# Patient Record
Sex: FEMALE | Race: WHITE
[De-identification: names, ages, dates, MRNs, and addresses within clinical notes are randomized per-mention and may not be internally consistent; named-entity substitution may affect disease eponyms.]

---

## 2019-11-13 ENCOUNTER — HOSPITAL ENCOUNTER (OUTPATIENT)
Dept: HOSPITAL 47 - LABWHC1 | Age: 49
Discharge: HOME | End: 2019-11-13
Attending: INTERNAL MEDICINE
Payer: COMMERCIAL

## 2019-11-13 DIAGNOSIS — E03.9: ICD-10-CM

## 2019-11-13 DIAGNOSIS — I10: Primary | ICD-10-CM

## 2019-11-13 LAB
ANION GAP SERPL CALC-SCNC: 8.7 MMOL/L (ref 4–12)
BUN SERPL-SCNC: 22 MG/DL (ref 9–27)
BUN/CREAT SERPL: 31.43 RATIO (ref 12–20)
CALCIUM SPEC-MCNC: 9.4 MG/DL (ref 8.7–10.3)
CHLORIDE SERPL-SCNC: 103 MMOL/L (ref 96–109)
CO2 SERPL-SCNC: 27.3 MMOL/L (ref 21.6–31.8)
GLUCOSE SERPL-MCNC: 88 MG/DL (ref 70–110)
POTASSIUM SERPL-SCNC: 3.7 MMOL/L (ref 3.5–5.5)
SODIUM SERPL-SCNC: 139 MMOL/L (ref 135–145)
T4 FREE SERPL-MCNC: 0.9 NG/DL (ref 0.8–1.8)

## 2019-11-13 PROCEDURE — 84439 ASSAY OF FREE THYROXINE: CPT

## 2019-11-13 PROCEDURE — 84432 ASSAY OF THYROGLOBULIN: CPT

## 2019-11-13 PROCEDURE — 36415 COLL VENOUS BLD VENIPUNCTURE: CPT

## 2019-11-13 PROCEDURE — 86376 MICROSOMAL ANTIBODY EACH: CPT

## 2019-11-13 PROCEDURE — 80048 BASIC METABOLIC PNL TOTAL CA: CPT

## 2019-11-13 PROCEDURE — 86800 THYROGLOBULIN ANTIBODY: CPT

## 2019-11-13 PROCEDURE — 84443 ASSAY THYROID STIM HORMONE: CPT

## 2020-05-15 ENCOUNTER — HOSPITAL ENCOUNTER (EMERGENCY)
Dept: HOSPITAL 47 - EC | Age: 50
Discharge: HOME | End: 2020-05-15
Payer: COMMERCIAL

## 2020-05-15 VITALS
RESPIRATION RATE: 16 BRPM | TEMPERATURE: 98 F | SYSTOLIC BLOOD PRESSURE: 145 MMHG | DIASTOLIC BLOOD PRESSURE: 98 MMHG | HEART RATE: 76 BPM

## 2020-05-15 DIAGNOSIS — Z88.2: ICD-10-CM

## 2020-05-15 DIAGNOSIS — Z79.890: ICD-10-CM

## 2020-05-15 DIAGNOSIS — M54.12: Primary | ICD-10-CM

## 2020-05-15 DIAGNOSIS — Z88.1: ICD-10-CM

## 2020-05-15 DIAGNOSIS — Z88.5: ICD-10-CM

## 2020-05-15 DIAGNOSIS — Z79.899: ICD-10-CM

## 2020-05-15 DIAGNOSIS — E06.3: ICD-10-CM

## 2020-05-15 DIAGNOSIS — Z88.8: ICD-10-CM

## 2020-05-15 DIAGNOSIS — K21.9: ICD-10-CM

## 2020-05-15 DIAGNOSIS — E78.5: ICD-10-CM

## 2020-05-15 DIAGNOSIS — I10: ICD-10-CM

## 2020-05-15 DIAGNOSIS — E11.9: ICD-10-CM

## 2020-05-15 DIAGNOSIS — Z98.1: ICD-10-CM

## 2020-05-15 PROCEDURE — 72050 X-RAY EXAM NECK SPINE 4/5VWS: CPT

## 2020-05-15 PROCEDURE — 96372 THER/PROPH/DIAG INJ SC/IM: CPT

## 2020-05-15 PROCEDURE — 99283 EMERGENCY DEPT VISIT LOW MDM: CPT

## 2020-05-15 NOTE — ED
Extremity Problem HPI





- General


Chief complaint: Extremity Problem,Nontraumatic


Stated complaint: Neck and back pain


Time Seen by Provider: 05/15/20 19:46


Source: patient, family


Mode of arrival: ambulatory


Limitations: no limitations





- History of Present Illness


Initial comments: 





Patient is a 49-year-old female presenting to emergency Department with a chief 

complaint of neck pain.  Patient reports history of chronic back pain.  She also

had a fusion of C5 and C6 about 12 years ago.  Patient reports also disc 

herniations.  Patient reports the pain started about one week ago and has been 

gradually getting worse.  Patient reports the pain starts near the cervical 

prominence and radiates bilaterally along the traps and bilateral upper 

extremity.  Patient reports it is a burning sensation near the neck, however she

does have general achiness particularly in the left upper extremity.  Patient 

denies any weakness.  States she had the exact same symptoms in the right upper 

extremity before she had a cervical fusion.  Denies any chest pain shortness of 

breath and diaphoretic episodes, nausea vomiting, dizziness or lightheadedness. 

Does report taking ibuprofen this morning with no significant department.  

Patient denies trauma.





- Related Data


                                Home Medications











 Medication  Instructions  Recorded  Confirmed


 


Atorvastatin [Lipitor] 10 mg PO HS 18


 


Cyanocobalamin (Vitamin B-12) 5,000 mcg PO DAILY 18





[Vitamin B-12]   


 


Hydrochlorothiazide 25 mg PO DAILY 18


 


Levothyroxine Sodium [Synthroid] 100 mcg PO DAILY 18


 


Lisinopril [Zestril] 5 mg PO DAILY 18


 


Potassium Chloride [Klor-Con 20] 20 meq PO DAILY 18


 


Ranitidine HCl 300 mg PO HS 18








                                  Previous Rx's











 Medication  Instructions  Recorded


 


Cyclobenzaprine [Flexeril] 10 mg PO TID PRN #15 tab 20


 


Lidocaine 5% Patch [Lidoderm] 1 patch TOPICAL DAILY #6 patch 20











                                    Allergies











Allergy/AdvReac Type Severity Reaction Status Date / Time


 


codeine Allergy Severe Vomiting,ra Verified 05/15/20 20:19





   sh  


 


Androgenic Anabolic Steroid AdvReac Severe Nausea & Verified 05/15/20 20:19





   Vomiting,  





   Swelling  


 


trimethoprim [From Bactrim] AdvReac Severe Nausea & Verified 05/15/20 20:19





   Vomiting &  





   Diarrhea  


 


sulfamethoxazole AdvReac  Nausea & Verified 05/15/20 20:19





[From Bactrim]   Vomiting &  





   Diarrhea  














Review of Systems


ROS Statement: 


Those systems with pertinent positive or pertinent negative responses have been 

documented in the HPI.





ROS Other: All systems not noted in ROS Statement are negative.





Past Medical History


Past Medical History: GERD/Reflux, Hyperlipidemia, Hypertension, Thyroid 

Disorder


Additional Past Medical History / Comment(s): IBS/diverticulitis, fatty liver, 

uterine fibroids, hashimoto's thyroid, borderline Type 2 DM,


History of Any Multi-Drug Resistant Organisms: MRSA


Date of last positivie culture/infection: 10/17/2013


MDRO Source:: forehead


Past Surgical History: Back Surgery, Breast Surgery, Cholecystectomy, 

Hysterectomy, Tonsillectomy, Tubal Ligation, Uterine Ablation


Additional Past Surgical History / Comment(s): cervical fusion 2008, breast

reduction , fatty tumors removed from the following areas: right axilla, 

left forearm, left anterior upper leg x2, right posterior upper leg.


Past Anesthesia/Blood Transfusion Reactions: Motion Sickness, Postoperative 

Nausea & Vomiting (PONV)


Past Psychological History: No Psychological Hx Reported


Smoking Status: Never smoker


Past Alcohol Use History: None Reported


Past Drug Use History: None Reported





- Past Family History


  ** Sister(s)


Family Medical History: Cancer, Fibromyalgia


Additional Family Medical History / Comment(s): first sister: ovarian cancer 

2018.  second sister: fibromyalgia 





  ** Father


Family Medical History: Myocardial Infarction (MI)


Additional Family Medical History / Comment(s):  at age 49





  ** Mother


Family Medical History: Cancer


Additional Family Medical History / Comment(s): pancreatic cancer  at 

age 59





General Exam


Limitations: no limitations


General appearance: alert, in no apparent distress


Head exam: Present: atraumatic, normocephalic, normal inspection


Eye exam: Present: normal appearance, PERRL, EOMI


Pupils: Present: normal accommodation


ENT exam: Present: normal exam, normal oropharynx, mucous membranes moist, TM's 

normal bilaterally, normal external ear exam


Neck exam: Present: normal inspection, tenderness (Tenderness surrounding cer

vical prominence.  Pain radiating bilaterally but mostly along the left upper 

extremity.  Pain along the left trapezius.), full ROM


Respiratory exam: Present: normal lung sounds bilaterally


Cardiovascular Exam: Present: regular rate, normal rhythm, normal heart sounds


Extremities exam: Present: normal inspection, full ROM


Back exam: Present: normal inspection, full ROM.  Absent: tenderness, muscle 

spasm, paraspinal tenderness, vertebral tenderness


Neurological exam: Present: alert, oriented X3, CN II-XII intact, normal gait


Psychiatric exam: Present: normal affect, normal mood


Skin exam: Present: warm, dry, intact, normal color





Course


                                   Vital Signs











  05/15/20 05/15/20





  19:27 21:35


 


Temperature 98.2 F 98.0 F


 


Pulse Rate 89 76


 


Respiratory 18 16





Rate  


 


Blood Pressure 146/99 145/98


 


O2 Sat by Pulse 97 99





Oximetry  














Medical Decision Making





- Medical Decision Making


Patient is a 49-year-old female with history of chronic neck pain presenting to 

the emergency department with a chief complaint of neck pain.  Symptoms started 

approximately a week with no trauma.  Patient has radiculopathy type symptoms 

radiating along bilateral upper extremities no mostly on the left side.  States 

she had these symptoms previously going mostly on her right upper extremity 

prior to cervical fusion of C5 and C6.  Cervical x-rays unremarkable.  Patient 

was given a Lidoderm patch, Toradol and Flexeril.  Reevaluation patient reports 

improvement symptoms.  No chest pain shortness of breath.  Patient advised to 

follow-up with an orthopedic specialist.  Patient will be discharged with 

Flexeril and advise about possible side effects of medication.  She was also 

given a  Lidoderm patch.  Return parameters were thoroughly discussed with 

patient is understanding and agreeable.  Case discussed with physician.








Disposition


Clinical Impression: 


 Cervical radiculopathy, Neck pain





Disposition: HOME SELF-CARE


Condition: Good


Instructions (If sedation given, give patient instructions):  Cervical Strain 

(DC)


Additional Instructions: 


Take prescribed medication as directed.  Follow-up with an orthopedic doctor.  

Return to emergency department if symptoms worsen.


Is patient prescribed a controlled substance at d/c from ED?: No


Referrals: 


Kevin Molina MD [Primary Care Provider] - 1-2 days


MARY Villarreal DO [Doctor of Osteopathic Medicine] - 1-2 days


Time of Disposition: 21:23

## 2020-05-15 NOTE — XR
EXAMINATION TYPE: XR cervical spine comp

 

DATE OF EXAM: 5/15/2020

 

COMPARISON: NONE

 

HISTORY: Neck pain

 

TECHNIQUE: 5 views

 

FINDINGS: Cervical vertebra have normal spacing and alignment.

There is old anterior fusion surgery at C5-6. Posterior elements are intact. There is no evidence of 
a fracture. Cervical basilar relationships is normal. Neural foramina appear fairly normal.

IMPRESSION: Negative cervical spine exam.

## 2020-07-08 ENCOUNTER — HOSPITAL ENCOUNTER (OUTPATIENT)
Dept: HOSPITAL 47 - RADMRIMAIN | Age: 50
Discharge: HOME | End: 2020-07-08
Attending: ORTHOPAEDIC SURGERY
Payer: COMMERCIAL

## 2020-07-08 DIAGNOSIS — M47.892: ICD-10-CM

## 2020-07-08 DIAGNOSIS — M48.02: ICD-10-CM

## 2020-07-08 DIAGNOSIS — Z98.1: ICD-10-CM

## 2020-07-08 DIAGNOSIS — M50.322: Primary | ICD-10-CM

## 2020-07-08 DIAGNOSIS — M25.78: ICD-10-CM

## 2020-07-08 PROCEDURE — 72156 MRI NECK SPINE W/O & W/DYE: CPT

## 2020-07-08 NOTE — MR
EXAMINATION TYPE: MR cervical spine wo/w con

 

DATE OF EXAM: 7/8/2020

 

COMPARISON: NONE

 

HISTORY: 49-year-old female Neck pain, headaches, left arm numbness, DDD

 

Technique: Multiplanar, multisequence images of the cervical spine were obtained before and after adm
inistration of 11 mL intravenous Gadavist gadolinium contrast.  

 

FINDINGS: 

No craniocervical junction abnormality, predental space widening, or prevertebral soft tissue swellin
g.

 

Status post C5-C6 ACDF.

 

Mild-to-moderate degenerative disc disease throughout the remaining levels with desiccated disks and 
discussed by complex formation.

 

Scattered facet and uncovertebral joint arthropathy.

 

Alignment is maintained.

 

At C2-C3, mild facet and uncovertebral joint arthropathy. No significant canal or foraminal stenosis.


 

At C3-C4, broad-based disc osteophyte complex with uncovertebral joint and facet degenerative change.
 There is mild left neuroforaminal stenosis and mild overall spinal canal stenosis with abutment of t
he ventral cord.

 

At C4-C5, above the fusion, there is broad-based disc osteophyte complex with uncovertebral joint and
 facet degenerative change. Moderate left and mild to moderate right neuroforaminal stenosis. Mild sp
inal canal stenosis with abutment of the ventral cord.

 

At the fused C5-C6 level, no significant canal or foraminal stenosis.

 

At C6-C7, below the fusion, there is disc osteophyte complex with superimposed right paracentral disc
 protrusion. Facet and uncovertebral joint degenerative change. Moderate right greater than left neur
al foraminal stenosis. Mild to moderate spinal canal stenosis with abutment of both the dorsal and ve
ntral cord and slight ventral cord flattening. Greater degree of compression on the right ventral cor
d due to the superimposed disc protrusion.

 

At C7-T1, mild facet arthropathy without canal or foraminal stenosis.

 

No suspicious enhancement within the spinal canal.

 

Some patchy signal projecting over the cervical cord on the sagittal T2 sequence. No definite T2-weig
hted cord could not.

 

 

IMPRESSION: 

 

1. Status post C5-C6 ACDF with mild-to-moderate degenerative disc disease throughout the remainder of
 the cervical spine. Scattered facet and uncovertebral joint arthropathy.

2. Disc osteophyte complex below the fusion at C6-C7 along with a superimposed right paracentral disc
 herniation contributes to mild-to-moderate spinal canal stenosis with abutment of both the dorsal an
d ventral cord and slight ventral cord flattening. No dianne cord compression or myelopathic cord sign
al change. Moderate right greater than left neuroforaminal stenosis here.

3. Additional mild spinal canal stenoses at C3-C4 and C4-C5.

4. Additional variable mild to moderate neural foraminal stenoses as outlined above.

## 2020-07-31 ENCOUNTER — HOSPITAL ENCOUNTER (OUTPATIENT)
Dept: HOSPITAL 47 - RADXRMAIN | Age: 50
Discharge: HOME | End: 2020-07-31
Attending: ORTHOPAEDIC SURGERY
Payer: COMMERCIAL

## 2020-07-31 DIAGNOSIS — Z01.818: Primary | ICD-10-CM

## 2020-07-31 PROCEDURE — 93005 ELECTROCARDIOGRAM TRACING: CPT

## 2020-07-31 PROCEDURE — 71046 X-RAY EXAM CHEST 2 VIEWS: CPT

## 2020-07-31 NOTE — XR
EXAMINATION TYPE: XR chest 2V

 

DATE OF EXAM: 7/31/2020

 

COMPARISON: 11/27/2017

 

HISTORY: Chest pain

 

TECHNIQUE:  Frontal and lateral views of the chest are obtained.

 

FINDINGS:  

 

There is no focal air space opacity.

 

No evidence for pneumothorax.  No pleural effusion.

 

The cardiac silhouette size is within normal limits.

 

The osseous structures are grossly intact.

 

IMPRESSION:  

 

1.  No acute cardiopulmonary process.

## 2020-08-04 ENCOUNTER — HOSPITAL ENCOUNTER (OUTPATIENT)
Dept: HOSPITAL 47 - LABPAT | Age: 50
Discharge: HOME | End: 2020-08-04
Attending: ORTHOPAEDIC SURGERY
Payer: COMMERCIAL

## 2020-08-04 DIAGNOSIS — M50.223: ICD-10-CM

## 2020-08-04 DIAGNOSIS — Z01.818: Primary | ICD-10-CM

## 2020-08-04 LAB
ALBUMIN SERPL-MCNC: 4.1 G/DL (ref 3.5–5)
ALP SERPL-CCNC: 63 U/L (ref 38–126)
ALT SERPL-CCNC: 80 U/L (ref 4–34)
ANION GAP SERPL CALC-SCNC: 8 MMOL/L
APTT BLD: 21.9 SEC (ref 22–30)
AST SERPL-CCNC: 53 U/L (ref 14–36)
BASOPHILS # BLD AUTO: 0.1 K/UL (ref 0–0.2)
BASOPHILS NFR BLD AUTO: 1 %
BUN SERPL-SCNC: 18 MG/DL (ref 7–17)
CALCIUM SPEC-MCNC: 9.7 MG/DL (ref 8.4–10.2)
CHLORIDE SERPL-SCNC: 104 MMOL/L (ref 98–107)
CO2 SERPL-SCNC: 25 MMOL/L (ref 22–30)
EOSINOPHIL # BLD AUTO: 0.2 K/UL (ref 0–0.7)
EOSINOPHIL NFR BLD AUTO: 3 %
ERYTHROCYTE [DISTWIDTH] IN BLOOD BY AUTOMATED COUNT: 4.41 M/UL (ref 3.8–5.4)
ERYTHROCYTE [DISTWIDTH] IN BLOOD: 12.8 % (ref 11.5–15.5)
GLUCOSE SERPL-MCNC: 156 MG/DL (ref 74–99)
HCT VFR BLD AUTO: 42 % (ref 34–46)
HGB BLD-MCNC: 13.6 GM/DL (ref 11.4–16)
INR PPP: 1 (ref ?–1.2)
LYMPHOCYTES # SPEC AUTO: 1.8 K/UL (ref 1–4.8)
LYMPHOCYTES NFR SPEC AUTO: 26 %
MCH RBC QN AUTO: 30.9 PG (ref 25–35)
MCHC RBC AUTO-ENTMCNC: 32.5 G/DL (ref 31–37)
MCV RBC AUTO: 95.2 FL (ref 80–100)
MONOCYTES # BLD AUTO: 0.2 K/UL (ref 0–1)
MONOCYTES NFR BLD AUTO: 4 %
NEUTROPHILS # BLD AUTO: 4.4 K/UL (ref 1.3–7.7)
NEUTROPHILS NFR BLD AUTO: 65 %
PH UR: 6 [PH] (ref 5–8)
PLATELET # BLD AUTO: 256 K/UL (ref 150–450)
POTASSIUM SERPL-SCNC: 4.3 MMOL/L (ref 3.5–5.1)
PROT SERPL-MCNC: 6.5 G/DL (ref 6.3–8.2)
PT BLD: 10.2 SEC (ref 9–12)
SODIUM SERPL-SCNC: 137 MMOL/L (ref 137–145)
SP GR UR: 1.02 (ref 1–1.03)
UROBILINOGEN UR QL STRIP: <2 MG/DL (ref ?–2)
WBC # BLD AUTO: 6.7 K/UL (ref 3.8–10.6)

## 2020-08-04 PROCEDURE — 85025 COMPLETE CBC W/AUTO DIFF WBC: CPT

## 2020-08-04 PROCEDURE — 81003 URINALYSIS AUTO W/O SCOPE: CPT

## 2020-08-04 PROCEDURE — 80053 COMPREHEN METABOLIC PANEL: CPT

## 2020-08-04 PROCEDURE — 85730 THROMBOPLASTIN TIME PARTIAL: CPT

## 2020-08-04 PROCEDURE — 85610 PROTHROMBIN TIME: CPT

## 2020-08-10 ENCOUNTER — HOSPITAL ENCOUNTER (OUTPATIENT)
Dept: HOSPITAL 47 - LABWHC1 | Age: 50
Discharge: HOME | End: 2020-08-10
Attending: ORTHOPAEDIC SURGERY
Payer: COMMERCIAL

## 2020-08-10 DIAGNOSIS — E11.9: Primary | ICD-10-CM

## 2020-08-10 DIAGNOSIS — R74.8: ICD-10-CM

## 2020-08-10 DIAGNOSIS — B19.9: ICD-10-CM

## 2020-08-10 LAB
ALBUMIN SERPL-MCNC: 4.3 G/DL (ref 3.8–4.9)
ALBUMIN/GLOB SERPL: 2.05 G/DL (ref 1.6–3.17)
ALP SERPL-CCNC: 76 U/L (ref 41–126)
ALT SERPL-CCNC: 71 U/L (ref 8–44)
AST SERPL-CCNC: 47 U/L (ref 13–35)
BILIRUB INDIRECT SERPL-MCNC: 0.3 MG/DL
GLOBULIN SER CALC-MCNC: 2.1 G/DL (ref 1.6–3.3)
GLUCOSE SERPL-MCNC: 139 MG/DL (ref 70–110)
PROT SERPL-MCNC: 6.4 G/DL (ref 6.2–8.2)

## 2020-08-10 PROCEDURE — 82947 ASSAY GLUCOSE BLOOD QUANT: CPT

## 2020-08-10 PROCEDURE — 80076 HEPATIC FUNCTION PANEL: CPT

## 2020-08-10 PROCEDURE — 80074 ACUTE HEPATITIS PANEL: CPT

## 2020-08-10 PROCEDURE — 36415 COLL VENOUS BLD VENIPUNCTURE: CPT

## 2020-08-17 ENCOUNTER — HOSPITAL ENCOUNTER (OUTPATIENT)
Dept: HOSPITAL 47 - OR | Age: 50
Setting detail: OBSERVATION
LOS: 1 days | Discharge: HOME | End: 2020-08-18
Attending: ORTHOPAEDIC SURGERY | Admitting: ORTHOPAEDIC SURGERY
Payer: COMMERCIAL

## 2020-08-17 VITALS — BODY MASS INDEX: 36.6 KG/M2

## 2020-08-17 VITALS — RESPIRATION RATE: 18 BRPM

## 2020-08-17 DIAGNOSIS — E78.5: ICD-10-CM

## 2020-08-17 DIAGNOSIS — E03.9: ICD-10-CM

## 2020-08-17 DIAGNOSIS — Z88.5: ICD-10-CM

## 2020-08-17 DIAGNOSIS — Z79.84: ICD-10-CM

## 2020-08-17 DIAGNOSIS — Z87.19: ICD-10-CM

## 2020-08-17 DIAGNOSIS — Z79.1: ICD-10-CM

## 2020-08-17 DIAGNOSIS — Z82.49: ICD-10-CM

## 2020-08-17 DIAGNOSIS — K30: ICD-10-CM

## 2020-08-17 DIAGNOSIS — Z88.2: ICD-10-CM

## 2020-08-17 DIAGNOSIS — K58.9: ICD-10-CM

## 2020-08-17 DIAGNOSIS — M50.123: Primary | ICD-10-CM

## 2020-08-17 DIAGNOSIS — Z79.891: ICD-10-CM

## 2020-08-17 DIAGNOSIS — Z90.49: ICD-10-CM

## 2020-08-17 DIAGNOSIS — I10: ICD-10-CM

## 2020-08-17 DIAGNOSIS — Z79.899: ICD-10-CM

## 2020-08-17 DIAGNOSIS — Z88.8: ICD-10-CM

## 2020-08-17 DIAGNOSIS — Z98.1: ICD-10-CM

## 2020-08-17 DIAGNOSIS — Z79.890: ICD-10-CM

## 2020-08-17 DIAGNOSIS — E11.9: ICD-10-CM

## 2020-08-17 DIAGNOSIS — M48.00: ICD-10-CM

## 2020-08-17 LAB
GLUCOSE BLD-MCNC: 146 MG/DL (ref 75–99)
GLUCOSE BLD-MCNC: 148 MG/DL (ref 75–99)
GLUCOSE BLD-MCNC: 151 MG/DL (ref 75–99)

## 2020-08-17 PROCEDURE — 72020 X-RAY EXAM OF SPINE 1 VIEW: CPT

## 2020-08-17 PROCEDURE — 86850 RBC ANTIBODY SCREEN: CPT

## 2020-08-17 PROCEDURE — 86900 BLOOD TYPING SEROLOGIC ABO: CPT

## 2020-08-17 PROCEDURE — 20936 SP BONE AGRFT LOCAL ADD-ON: CPT

## 2020-08-17 PROCEDURE — 86901 BLOOD TYPING SEROLOGIC RH(D): CPT

## 2020-08-17 PROCEDURE — 22551 ARTHRD ANT NTRBDY CERVICAL: CPT

## 2020-08-17 RX ADMIN — CEFAZOLIN SCH MLS/HR: 330 INJECTION, POWDER, FOR SOLUTION INTRAMUSCULAR; INTRAVENOUS at 23:09

## 2020-08-17 RX ADMIN — HYDROMORPHONE HYDROCHLORIDE PRN MG: 1 INJECTION, SOLUTION INTRAMUSCULAR; INTRAVENOUS; SUBCUTANEOUS at 15:29

## 2020-08-17 RX ADMIN — HYDROMORPHONE HYDROCHLORIDE PRN MG: 1 INJECTION, SOLUTION INTRAMUSCULAR; INTRAVENOUS; SUBCUTANEOUS at 20:16

## 2020-08-17 RX ADMIN — POTASSIUM CHLORIDE SCH MLS: 14.9 INJECTION, SOLUTION INTRAVENOUS at 07:11

## 2020-08-17 RX ADMIN — CEFAZOLIN SCH: 330 INJECTION, POWDER, FOR SOLUTION INTRAMUSCULAR; INTRAVENOUS at 17:10

## 2020-08-17 NOTE — XR
EXAMINATION TYPE: XR cervical spine 1V

 

DATE OF EXAM: 8/17/2020

 

COMPARISON: NONE

 

HISTORY: 50-year-old female heart replacement

 

TECHNIQUE: Intraoperative crosstable lateral view

 

FINDINGS: 

Patient is intubated. Redemonstrated C5-C6 ACDF. Interval corpectomy at C7.

 

IMPRESSION: 

Interval C7 corpectomy. Previous C5-C6 ACDF.

## 2020-08-17 NOTE — P.OP
Date of Procedure: 08/17/20


Preoperative Diagnosis: 


Herniated nucleus pulposis C6 7, upper extremity radiculopathy, neck pain, 

history of prior anterior cervical fusion C5 6


Postoperative Diagnosis: 


same


Anesthesia: GETA


Pathology: none sent


Condition: stable


Disposition: PACU


Description of Procedure: 


BRIEF OPERATIVE NOTE





Preoperative Diagnosis:Herniated nucleus pulposis C6 7, upper extremity 

radiculopathy, neck pain, history of prior anterior cervical fusion C5 6


Postoperative Diagnosis:Herniated nucleus pulposis C6 7, upper extremity 

radiculopathy, neck pain, history of prior anterior cervical fusion C5 6


with retained anterior cervical plate


Procedure: Anterior cervical decompression with discectomy and fusion C5 6


                  Placement of interbody graft C5 6    


         harvesting of local autogenous bone graft              


Surgeon: Dr. Villarreal


Assistant: Keith ESTEBAN who is present throughout the entire the case 

persistence during positioning, dissection, exposure, visualization, and all 

crucial elements of the case as well as closure.


Anesthesia: General anesthesia


Estimated blood loss:approximately 30 mL


Complications: None apparent


Components implanted:K2M [place] standalone interbody cage at C6 7 with 3 

screws measuring 14 mm


Disposition: To recovery room in good stable condition.





OPERATIVE INDICATIONS





The patient has had long-standing issues in their neck and upper extremities.  a

number of years ago patient had been through a surgery for her anterior cervical

spine with compression and fusion at C5 6 for disc herniation and she had done 

well with this.  over the past year the patient has been having some worsening 

of her symptoms in the past few months she has been having significant worsening

of her neck and upper extremities.  She is found have adjacent level 

degeneration at C6 7 below her prior fusion.  Her prior fusion.  Be solid.  She 

had disc herniation with foraminal stenosis and was having significant radicular

symptoms toward her left.  This was affecting her daily basis.The patient has 

been through conservative treatment.  she is not having any lasting benefit 

despite aggressive conservative treatment. We discussed various treatment 

options including surgery, and the patient wishes to proceed with surgery We 

discussed the risk, patient's alternatives and benefits of surgery including but

not limited to, risk of bleeding risk of infection, risk of need for further 

surgery, risk of decreased, loss of motion, muscle function, malunion nonunion, 

hardware failure, nerve damage, paralysis, heart attack, and death.





OPERATIVE SUMMARY





After discussing all the risks, patient alternatives and benefits at length, the

patient elected to proceed with surgical intervention, signed informed consent, 

and presented for their procedure.  The patient was seen and examined in the 

preoperative holding area and the surgical site was marked.  The patient was 

given antibiotics and brought to the operating room.





The patient was positioned on the operating room table in a supine position 

being careful to pad any bony prominences and pressure points.  The patient was 

sedated and intubated by anesthesia in standard fashion.  Once the airway and C-

spine were stabilized the patient's arms were padded and tucked at her side, 

with her shoulders gently taped.  The head was placed in a donut pad with the 

neck in good neutral alignment and position.  We were careful to maintain the 

patient's cervical spine and good neutral alignment and position throughout.





The patient was prepped and draped in a normal standard fashion.  An appropriate

timeout and keystone protocol performed.  We were able to proceed with the 

surgery.  The local wound area was infiltrated with local anestheticbelow her 

prior incision on the right. 





An incision was made transversely approximately 2-1/2 cm over the appropriate 

levelsat C6.  Dissection was taken down subcutaneously to the level of the 

platysma which was split in line with its fibers.  Dissection was taken with a 

carotid approach, with the trachea and esophagus medial and the carotid sheath 

laterally.  We dissected down to the anterior surface of the vertebral bodies.  

there was some increased scarring around the area and it took a bit of more 

meticulous dissection were able to expose the area appropriately.  I was able to

see the plate at C56 and it appeared be stable and intact. there is no evidence 

of any loosening of the hardware at C5 6. Intraoperative x-ray was taken which 

showed a marker at the appropriate levelat C6 7.  With the appropriate level 

positively confirmed, we were able to proceed with discectomy at the appropriate

levelsof C6 7.  All of the operative levels were exposed appropriately. The 

patient had all their twitches back, and there was no evidence of recurrent 

laryngeal issue.  The wound was copiously irrigated and suctioned dry as had 

been done periodically throughout the case.  At the appropriate level of C6 7I 

established an annulotomy with an 11 blade scalpel.  I felt there was enough 

space at the area to be able to keep the plate at C5 6 intact and uses stand-

alone interbody cage at C6 7.  I did not remove any of the hardware C5 6.A 

discectomy was performed with a combination of pituitary rongeurs, curettes, a 

high-speed bur, and Kerrison rongeurs.  The posterior longitudinal ligament was 

taken down as were any posterior osteophytes.  note was made a posterior 

osteophytes as well as anterior osteophytes.  These were removed appropriately 

and the posterior osteophytes Improved decompression.  The discectomy and the 

decompression was able to be completed. This gave good central and bilateral 

foraminal decompression.  I was able to save local autogenous bone graft for use

in the cage to help facilitate fusion.There is no evidence of any dural tear or 

leak.  The endplates were prepared with a high-speed bur.  With the endplates in

good parallel position, I was able to size for the appropriate size interbody 

graft.  The wound was irrigated and suctioned dry.





I decided use a K2M chest peek interbody cage.  I sized her appropriate size 

cage.  It was prepared and positioned on the . the cagegraft was prep

ared and malleted into position.  It had good alignment and position with the 

anterior surface flush with the anterior surface of the vertebral bodies. 





with the cage intact I was able to use the guidance to establish drill holes 

with 2 screws inferiorly.  There placed appropriately in good alignment good 

position.  The  was removed I was able to use a angled punch and 

screwdriver to establish screws at C6.  Intraoperative x-ray was taken which 

showed good alignment and position of the hardware at C67 with no evidence of 

protrusion posteriorly.  With the hardware confirmed I was able to then 

tightened down the screws to get good torque and locked at the interbody cage 

itself.  Good stability have any evidence of loosening.   The construct was 

checked and found to be stable.  Intraoperative x-ray was taken which showed 

good alignment and position of the implants at the appropriate levelsof C6 7.  

There was no evidence of any dural tear or leak.  Good hemostasis was 

maintained.  The wound was copiously irrigated and suctioned dry as had been 

done  periodically throughout the case.





The platysma was closed with absorbable suture.  The subcutaneous tissue was 

closed.  The subcuticular tissue was closed with absorbable suture.  The wound 

was cleaned and dried and dressed appropriately.





A soft cervical collar was placed appropriately.  The patient was woken up by 

anesthesia, extubated, transferred back gently to their hospital bed and brought

to the recovery room in good stable condition.





The patient will be admitted to the hospital for appropriate postoperative care,

medical management and monitoring.  We will continue to follow them closely 

about the postoperative course.

## 2020-08-17 NOTE — XR
EXAMINATION TYPE: XR cervical spine 1V

 

DATE OF EXAM: 8/17/2020

 

COMPARISON: 5/15/2020

 

HISTORY: 50-year-old female needle placement

 

TECHNIQUE: Single portable crosstable lateral view

 

FINDINGS: 

Intraoperative views shows spiculation and C5-C6 ACDF. A metal needle as an anterior approach at the 
C6-C7 anterior disc interspace.

 

 

IMPRESSION: 

Intraoperative view with prior C5-C6 ACDF and surgical needle anteriorly at the C6-7 disc interspace.

## 2020-08-18 VITALS — HEART RATE: 65 BPM | SYSTOLIC BLOOD PRESSURE: 122 MMHG | TEMPERATURE: 98.3 F | DIASTOLIC BLOOD PRESSURE: 76 MMHG

## 2020-08-18 LAB — GLUCOSE BLD-MCNC: 128 MG/DL (ref 75–99)

## 2020-08-18 RX ADMIN — POTASSIUM CHLORIDE SCH: 14.9 INJECTION, SOLUTION INTRAVENOUS at 05:17

## 2020-08-18 RX ADMIN — HYDROMORPHONE HYDROCHLORIDE PRN MG: 1 INJECTION, SOLUTION INTRAMUSCULAR; INTRAVENOUS; SUBCUTANEOUS at 01:49

## 2020-09-03 ENCOUNTER — HOSPITAL ENCOUNTER (OUTPATIENT)
Dept: HOSPITAL 47 - LABWHC1 | Age: 50
Discharge: HOME | End: 2020-09-03
Attending: INTERNAL MEDICINE
Payer: COMMERCIAL

## 2020-09-03 DIAGNOSIS — E11.9: ICD-10-CM

## 2020-09-03 DIAGNOSIS — I10: ICD-10-CM

## 2020-09-03 DIAGNOSIS — K75.9: Primary | ICD-10-CM

## 2020-09-03 LAB
ALBUMIN SERPL-MCNC: 4.5 G/DL (ref 3.8–4.9)
ALBUMIN/GLOB SERPL: 1.96 G/DL (ref 1.6–3.17)
ALP SERPL-CCNC: 96 U/L (ref 41–126)
ALT SERPL-CCNC: 118 U/L (ref 8–44)
ANION GAP SERPL CALC-SCNC: 10.8 MMOL/L (ref 4–12)
AST SERPL-CCNC: 69 U/L (ref 13–35)
BILIRUB INDIRECT SERPL-MCNC: 0.5 MG/DL
BUN SERPL-SCNC: 15 MG/DL (ref 9–27)
BUN/CREAT SERPL: 15 RATIO (ref 12–20)
CALCIUM SPEC-MCNC: 9.6 MG/DL (ref 8.7–10.3)
CHLORIDE SERPL-SCNC: 102 MMOL/L (ref 96–109)
CO2 SERPL-SCNC: 25.2 MMOL/L (ref 21.6–31.8)
GLOBULIN SER CALC-MCNC: 2.3 G/DL (ref 1.6–3.3)
GLUCOSE SERPL-MCNC: 203 MG/DL (ref 70–110)
MAGNESIUM SPEC-SCNC: 1.6 MG/DL (ref 1.5–2.4)
POTASSIUM SERPL-SCNC: 3.6 MMOL/L (ref 3.5–5.5)
PROT SERPL-MCNC: 6.8 G/DL (ref 6.2–8.2)
SODIUM SERPL-SCNC: 138 MMOL/L (ref 135–145)
T4 FREE SERPL-MCNC: 1.2 NG/DL (ref 0.8–1.8)

## 2020-09-03 PROCEDURE — 84443 ASSAY THYROID STIM HORMONE: CPT

## 2020-09-03 PROCEDURE — 85652 RBC SED RATE AUTOMATED: CPT

## 2020-09-03 PROCEDURE — 83735 ASSAY OF MAGNESIUM: CPT

## 2020-09-03 PROCEDURE — 36415 COLL VENOUS BLD VENIPUNCTURE: CPT

## 2020-09-03 PROCEDURE — 84439 ASSAY OF FREE THYROXINE: CPT

## 2020-09-03 PROCEDURE — 80048 BASIC METABOLIC PNL TOTAL CA: CPT

## 2020-09-03 PROCEDURE — 80076 HEPATIC FUNCTION PANEL: CPT

## 2020-09-29 ENCOUNTER — HOSPITAL ENCOUNTER (OUTPATIENT)
Dept: HOSPITAL 47 - RADUSWWP | Age: 50
Discharge: HOME | End: 2020-09-29
Attending: INTERNAL MEDICINE
Payer: COMMERCIAL

## 2020-09-29 DIAGNOSIS — Z88.8: ICD-10-CM

## 2020-09-29 DIAGNOSIS — Z88.5: ICD-10-CM

## 2020-09-29 DIAGNOSIS — R94.5: Primary | ICD-10-CM

## 2020-09-29 PROCEDURE — 76705 ECHO EXAM OF ABDOMEN: CPT

## 2020-09-29 NOTE — US
EXAMINATION TYPE: US liver

 

DATE OF EXAM: 9/29/2020

 

COMPARISON: CT 2/24/14

 

CLINICAL HISTORY: R94.5 Abnormal Liver Function Test. Hx of cholecystectomy

 

EXAM MEASUREMENTS:

 

Liver Length:  18.7 cm   

Gallbladder Wall:  Surgically absent    

CBD:  0.9 cm

Right Kidney:  11.9 x 4.9 x 5.3  cm

 

 

 

Pancreas:  Limited by bowel gas

Liver:  Measures within upper limits of normal; Increased attenuation, decreased visualization of ves
sels suggestive of fatty infiltrate  

Gallbladder:  Surgically absent 

**Evidence for sonographic Cooper's sign:  No

CBD:  wnl 

Right Kidney:  No hydronephrosis or masses seen 

 

 

 

IMPRESSION: 

1. Nonspecific pattern to the liver can be associated with hepatic steatosis. Correlate clinically to
 exclude hepatitis or hepatocellular diffuse disease.

## 2021-06-09 ENCOUNTER — HOSPITAL ENCOUNTER (OUTPATIENT)
Dept: HOSPITAL 47 - OR | Age: 51
Discharge: HOME | End: 2021-06-09
Attending: PODIATRIST
Payer: COMMERCIAL

## 2021-06-09 VITALS — BODY MASS INDEX: 35.6 KG/M2

## 2021-06-09 VITALS — TEMPERATURE: 97 F

## 2021-06-09 VITALS — SYSTOLIC BLOOD PRESSURE: 124 MMHG | HEART RATE: 64 BPM | DIASTOLIC BLOOD PRESSURE: 75 MMHG

## 2021-06-09 VITALS — RESPIRATION RATE: 16 BRPM

## 2021-06-09 DIAGNOSIS — Z82.49: ICD-10-CM

## 2021-06-09 DIAGNOSIS — I10: ICD-10-CM

## 2021-06-09 DIAGNOSIS — Z88.5: ICD-10-CM

## 2021-06-09 DIAGNOSIS — Z88.8: ICD-10-CM

## 2021-06-09 DIAGNOSIS — Z79.891: ICD-10-CM

## 2021-06-09 DIAGNOSIS — E06.3: ICD-10-CM

## 2021-06-09 DIAGNOSIS — Z90.710: ICD-10-CM

## 2021-06-09 DIAGNOSIS — Z98.1: ICD-10-CM

## 2021-06-09 DIAGNOSIS — R63.5: ICD-10-CM

## 2021-06-09 DIAGNOSIS — E11.9: ICD-10-CM

## 2021-06-09 DIAGNOSIS — Z88.2: ICD-10-CM

## 2021-06-09 DIAGNOSIS — M20.21: Primary | ICD-10-CM

## 2021-06-09 DIAGNOSIS — Z90.89: ICD-10-CM

## 2021-06-09 DIAGNOSIS — E03.9: ICD-10-CM

## 2021-06-09 DIAGNOSIS — Z79.84: ICD-10-CM

## 2021-06-09 DIAGNOSIS — Z79.890: ICD-10-CM

## 2021-06-09 DIAGNOSIS — K58.9: ICD-10-CM

## 2021-06-09 DIAGNOSIS — Z87.19: ICD-10-CM

## 2021-06-09 DIAGNOSIS — E78.5: ICD-10-CM

## 2021-06-09 DIAGNOSIS — Z79.899: ICD-10-CM

## 2021-06-09 DIAGNOSIS — Z98.890: ICD-10-CM

## 2021-06-09 DIAGNOSIS — K76.0: ICD-10-CM

## 2021-06-09 LAB
GLUCOSE BLD-MCNC: 110 MG/DL (ref 75–99)
GLUCOSE BLD-MCNC: 87 MG/DL (ref 75–99)

## 2021-06-09 PROCEDURE — 73620 X-RAY EXAM OF FOOT: CPT

## 2021-06-09 PROCEDURE — 28750 FUSION OF BIG TOE JOINT: CPT

## 2021-06-09 RX ADMIN — FENTANYL CITRATE ONE MCG: 50 INJECTION, SOLUTION INTRAMUSCULAR; INTRAVENOUS at 14:20

## 2021-06-09 RX ADMIN — FENTANYL CITRATE ONE MCG: 50 INJECTION, SOLUTION INTRAMUSCULAR; INTRAVENOUS at 14:09

## 2021-06-09 NOTE — FL
EXAMINATION TYPE: FL guidance operating room, XR foot limited RT

 

DATE OF EXAM: 6/9/2021

 

CLINICAL HISTORY: Hallux rigidus first metatarsophalangeal joint. Toe pain.

 

TECHNIQUE: Fluoroscopy. Limited intraoperative view right foot.

 

COMPARISON:  None.

 

FINDINGS:  Fluoroscopic guidance was provided during open reduction internal fixation procedure perfo
rmed by Dr. Sandoval.  A total of 43 seconds of fluoroscopic time was utilized during the procedure and 
1 spot images was acquired.

 

Single intraoperative image of prior shows placement of fixating plate across first metatarsophalange
al joint with satisfactory alignment on single projection.

 

IMPRESSION:  As Above.

## 2021-06-09 NOTE — P.OP
Date of Procedure: 06/09/21


Preoperative Diagnosis: 


Hallux rigidus right foot


Postoperative Diagnosis: 


Same


Procedure(s) Performed: 


First metatarsal phalangeal joint arthrodesis right foot


Implants: 


Bowers medical crosscheck plate with associated locking screws and 1 compression

screw


AlloMatrix


Anesthesia: WESTLEYA


Surgeon: Wade Sandoval


Estimated Blood Loss (ml): 2


Pathology: none sent


Condition: stable


Disposition: PACU


Indications for Procedure: 


Painful, arthritic first metatarsal phalangeal joint right foot


Operative Findings: 


Greater than 50% full-thickness articular cartilage loss on the first metatarsal

head and proximal phalanx.  Significant osteophytic formation on the dorsal and 

medial aspects of the first metatarsal phalangeal joint right foot


Description of Procedure: 


The patient brought into the operative is on table supine position.  Timeout was

taken to confirm correct patient identifiers, correct procedure, and correct 

site surgery.  When the room was in agreement the patient was induced and placed

under general anesthetic.  A well-padded tourniquet was placed on the right 

ankle and then 20 mL of 0.25% Marcaine was injected as right ankle block.  Right

foot was prepped and draped usual manner.  The leg was exsanguinated the 

tourniquet inflated to 250 hours mercury.  Attention directed over the dorsal 

aspect of the first metatarsal phalangeal joint where an incision made between 

neurovascular structures and the long extensor tendon.  It was deepened down to 

the saphenous layer careful to identify, avoid, and retract any neurovascular 

structures and cauterize any bleeding vessels.  Next the capsular incision was 

made medial to the long extensor tendon, and then subperiosteal dissection was 

performed to reflect at the base of proximal phalanx and head of the first 

metatarsal


Inspection of the articular surface of the first metatarsal base and proximal 

phalanx showed greater than 75% full-thickness rotation.  There is no 

significant osteophytic formation of the dorsal and medial aspects of the first 

metatarsal head.  There is also a free loose body in the dorsal aspect of the 

joint.  Loose body was removed and then the sagittal saw was used to remove the 

osteophytes of the first metatarsal head.  The guidewire was then utilized and 

placed into the central aspect of the first metatarsal head and advanced into 

the medullary canal well to long access.  The convex reamer was then used to 

remove the articular cartilage and subchondral bone down to bleeding medullary 

bone.  The guidewire was removed and then utilized to fenestrate the head of the

first metatarsal.  Same guidewire was then inserted at the base of the proximal 

phalanx in the central aspect of the articular cartilage and advanced into the 

medullary canal parallel to the long axis of the toe.  The convex reamer was 

then used to remove the articular cartilage and subchondral bone down to 

bleeding medullary bone.  The wire was then used to fenestrate the joint 

surface.  The wound is irrigated with normal saline.  1 mL of right medical 

AlloMatrix was then combined and then placed between the arthrodesis segments.  

The right medical crosscheck plate was placed over the arthrodesis site and then

temporarily fixated.  Fluoroscopy indicated the position of both total hardware 

were appropriate next a 2.7 mm locking screw was placed over the distal holes in

the plate.  Then the temporary fixation was removed and another 2.7 mm locking 

screw inserted distally.  Next the drill guide was placed in the compression 

slot and then the hole drilled for the compression screw.  The compression screw

was inserted and once it engaged the plate to act significantly compressed the 

arthrodesis site.  Then 3.5 locking screws were placed in the most proximal 

holes of the plate.  Final fluoroscopic imaging showed stable constructs with 

good bony contact and acceptable alignment of the first metatarsal phalangeal 

joint as well as proper placement of hardware.  The wound is irrigated 

thoroughly with saline the capsules closed with 2-0 Vicryl subcu closure done 

for Monocryl skin closure done with 0 stratified effects in a running 

subarticular manner.  Dermal glue was applied and allowed to dry then Steri-

Strips and a dry sterile dressing applied to the right foot.  The tourniquet was

released and capillary refill return to all digits on the right foot.  Then the 

patient was placed in a well molded, well-padded, plaster posterior mold sugar 

tong splint.  The splint was held at 90 until dried.  Then anesthesia was 

reversed and the patient was taken recovery with vital signs stable.

## 2022-01-20 ENCOUNTER — HOSPITAL ENCOUNTER (OUTPATIENT)
Dept: HOSPITAL 47 - RADMAMWWP | Age: 52
Discharge: HOME | End: 2022-01-20
Attending: OBSTETRICS & GYNECOLOGY
Payer: COMMERCIAL

## 2022-01-20 DIAGNOSIS — Z12.31: Primary | ICD-10-CM

## 2022-01-20 DIAGNOSIS — N95.1: ICD-10-CM

## 2022-01-20 PROCEDURE — 77063 BREAST TOMOSYNTHESIS BI: CPT

## 2022-01-20 PROCEDURE — 77067 SCR MAMMO BI INCL CAD: CPT

## 2022-01-20 NOTE — MM
Reason for exam: screening  (asymptomatic).

Last mammogram was performed 3 years and 4 months ago.



History:

Patient is postmenopausal and history of other cancer.

Reductions of both breasts, 2007.  Benign excisional biopsy of the right breast.



Physical Findings:

A clinical breast exam by your physician is recommended on an annual basis and 

results should be correlated with mammographic findings.



MG 3D Screening Mammo W/Cad

Bilateral CC and MLO view(s) were taken.

Prior study comparison: September 27, 2018, right breast MG 3d diag mammo w/cad 

RT.  August 31, 2018, mammogram.

The breast tissue is heterogeneously dense. This may lower the sensitivity of 

mammography.  There is chronic nodularity in the right breast. There is no 

discrete abnormality.





ASSESSMENT: Negative, BI-RAD 1



RECOMMENDATION:

Routine screening mammogram of both breasts in 1 year.

## 2022-04-13 ENCOUNTER — HOSPITAL ENCOUNTER (OUTPATIENT)
Dept: HOSPITAL 47 - RADMRIMAIN | Age: 52
Discharge: HOME | End: 2022-04-13
Attending: ORTHOPAEDIC SURGERY
Payer: COMMERCIAL

## 2022-04-13 DIAGNOSIS — M50.123: ICD-10-CM

## 2022-04-13 DIAGNOSIS — R53.1: ICD-10-CM

## 2022-04-13 DIAGNOSIS — M79.12: ICD-10-CM

## 2022-04-13 DIAGNOSIS — E66.9: ICD-10-CM

## 2022-04-13 DIAGNOSIS — Z48.89: Primary | ICD-10-CM

## 2022-04-13 DIAGNOSIS — M50.223: ICD-10-CM

## 2022-04-13 PROCEDURE — 72156 MRI NECK SPINE W/O & W/DYE: CPT

## 2022-04-13 NOTE — MR
MRI CERVICAL SPINE:

 

CLINICAL HISTORY: Cervicalgia and radiculopathy. Headaches with pain into left shoulder. DDD. Prior f
usion.

 

TECHNIQUE: Multiplanar, multisequence imaging of the cervical spine is performed without and with IV 
contrast, 10.5 cc of gadolinium was given intravenously.

 

COMPARISON: Prior MRI cervical spine July 8, 2020. Cervical spine x-ray August 17, 2020.

 

FINDINGS: Sagittal images of the cervical spine show the craniocervical junction to remain within nor
mal limits.  The cervical and upper thoracic spinal cord shows diminished AP diameter and superior C7
 level at site of prominent artifact from metallic cage material C6-C7 level. Artifact from anterior 
fusion plate C5-C6 level is redemonstrated.  Vertebral alignment is stable and straightened.  The tish
tebral body and intravertebral disk heights are normal above C5 and below C7 surgical levels.  The levi
ne marrow signal intensity is within normal limits above and below surgical levels. No new abnormal p
ostcontrast enhancement is seen.

 

Axial images show C2-C3 level to remain within normal limits.

 

Axial images at C3-C4 level redemonstrate broad-based left paracentral spur disc complex effacing the
 anterior thecal sac and uncovertebral facet degenerative changes greater on the left causing moderat
e left-sided neural foraminal narrowing. There is mild right-sided neural foraminal narrowing. Findin
gs similar to prior.

 

Axial images at C4-C5 level redemonstrated broad-based posterior disc protrusion facing anterior thec
al sac and posterior spurring causing mild-to-moderate bilateral neural foraminal narrowing. No signi
ficant change from prior. Generalized diminished AP canal size C3-C4 C4-C5 level is redemonstrated.

 

Axial images at the C5-C6 level show artifact from surgical change, bilateral neural foramina remain 
patent.

 

Axial images at the C6-C7 level showing more prominent blooming artifact from large metallic disc mat
erial making evaluation suboptimal and likely causing the indentation along the ventral wall of the s
ashish cord. No abnormal signal in the cord is present. Possible mild bilateral neural foraminal narro
wing though this is likely susceptibility artifact related also.

 

Axial images at C7-T1 level remain within normal limits.

 

IMPRESSION: Interval surgery at C6-C7 disc space level. Increased blooming artifact at this level trino
es evaluation suboptimal Stable and satisfactory alignment. Stable postsurgical changes C5-C6 level. 
Stable degenerative changes C3-C4 and C4-C5 levels. Further details as discussed above.

## 2022-05-03 ENCOUNTER — HOSPITAL ENCOUNTER (OUTPATIENT)
Dept: HOSPITAL 47 - RADBDWWP | Age: 52
Discharge: HOME | End: 2022-05-03
Attending: OBSTETRICS & GYNECOLOGY
Payer: COMMERCIAL

## 2022-05-03 DIAGNOSIS — N95.1: ICD-10-CM

## 2022-05-03 DIAGNOSIS — Z12.31: Primary | ICD-10-CM

## 2022-05-03 PROCEDURE — 77080 DXA BONE DENSITY AXIAL: CPT

## 2022-05-04 NOTE — BD
EXAMINATION TYPE: Axial Bone Density

 

DATE OF EXAM: 5/3/2022

 

COMPARISON: NONE

 

CLINICAL HISTORY: 51 years year old Female.  ICD-10 CODE: N95.1 MENOPAUSAL AND FEMALE CLIMACTERIC STA
MIKEL

 

Height:  67.5

Weight:  227.4

 

FRAX RISK QUESTIONS:

Alcohol (3 or more units per day):  no

Family History (Parent hip fracture):  no

Glucocorticoids (More than 3mos):  no

           (Ex: prednisone, prednisolone, methylprednisolone, dexamethasone, and hydrocortisone).    
     

History of Fracture in Adulthood: no

Secondary Osteoporosis:

  1.  Type 1 Diabetes: yes

  2.  Hyperthyroidism: no

  3.  Menopause before 45: yes

  4.  Malnutrition: no

  5.  Chronic liver disease: yes

Rheumatoid Arthritis: no

Current Tobacco Use: no

 

RISK FACTORS 

HISTORY OF: 

Surgery to Spine/Hip(right/left)/Wrist (right/left): no

Family History of Osteoporosis: no

Active: yes

Diet low in dairy products/other sources of calcium:  no

Postmenopausal woman: yes

Lost more than 2 inches in height since high school: no

 

MEDICATIONS: diabetic meds

Thyroid Medications:  levothyroxine

How Lon

 

 

Additional History:

 

 

EXAM MEASUREMENTS: 

Bone mineral densitometry was performed using the newScale System.

Bone mineral density as measured about the Lumbar spine is:  

----- L1-L4(G/cm2): 1.609

T Score Values are as follows:

----- L1: 2.8

----- L2: 3.3

----- L3: 4.0

----- L4: 3.9

----- L1-L4: 3.6

Bone mineral density : baseline

 

Bone mineral density about the R hip (g/cm2): 1.172

Bone mineral density about the L hip (g/cm2): 1.195

T Score values are as follows:

-----R Neck: 1.0

-----L Neck: 1.1

-----R Total: 2.1

-----L Total: 1.9

Bone mineral density : baseline

 

 

FRAX%s: The graph provided illustrates a 3.7% chance for a major osteoporotic fx and a 0.0% chance fo
r the hips probability for fx in 10 years time.

 

IMPRESSION:

 

1. Normal bone density

 

NOTE:  T-SCORE=SD OF THE YOUNG ADULT MEAN.

## 2023-02-02 ENCOUNTER — HOSPITAL ENCOUNTER (OUTPATIENT)
Dept: HOSPITAL 47 - RADMAMWWP | Age: 53
Discharge: HOME | End: 2023-02-02
Attending: INTERNAL MEDICINE
Payer: COMMERCIAL

## 2023-02-02 DIAGNOSIS — N60.01: Primary | ICD-10-CM

## 2023-02-02 DIAGNOSIS — Z90.721: ICD-10-CM

## 2023-02-02 DIAGNOSIS — Z78.0: ICD-10-CM

## 2023-02-02 PROCEDURE — 77062 BREAST TOMOSYNTHESIS BI: CPT

## 2023-02-02 PROCEDURE — 77066 DX MAMMO INCL CAD BI: CPT

## 2023-02-02 NOTE — MM
Reason for Exam: Clinical finding. 

Last mammogram was performed 1 year(s) and 1 month(s) ago. 

Indicated Problems: 

Palpable abnormality of the right side (size 4cm) for 3 Month(s).





Patient History: 

Menarche at age 12. First Full-Term Pregnancy at age 18. Left ovary removed at age 37. Right ovary

removed at age 37. Hysterectomy at age 37. Postmenopausal. Other cancer. 2007, Bilateral Reduction.

Benign Excisional Biopsy on the right side. 





Risk Values: 

Kanwal 5 year model risk: 0.9%.

NCI Lifetime model risk: 7.3%.





Prior Study Comparison: 

8/31/2018  Screening Mammogram, Unknown. 9/27/2018 Right Diagnostic Mammogram, [facility]. 1/20/2022

Bilateral Screening Mammogram, [facility]. 





Tissue Density: 

The breast tissue is heterogeneously dense. This may lower the sensitivity of mammography.





Findings: 

Analyzed By CAD. 

No suspicious mass, calcification or distortion.



No finding to correlate with palpable abnormality. 





Overall Assessment: Incomplete: need additional imaging evaluation, BI-RAD 0





Management: 

Diagnostic Breast Ultrasound of both breasts.

A clinical breast exam by your physician is recommended on an annual basis and results should be

correlated with mammographic findings.  This exam should not preclude additional follow-up of

suspicious palpable abnormalities.  Results were given to the patient verbally at the time of exam.



Electronically signed and approved by: Carson Mota DO

## 2023-02-02 NOTE — USB
Reason for Exam: Clinical finding. 





Patient History: 

Menarche at age 12. First Full-Term Pregnancy at age 18. Left ovary removed at age 37. Right ovary

removed at age 37. Hysterectomy at age 37. Postmenopausal. Other cancer. 2007, Bilateral Reduction.

Benign Excisional Biopsy on the right side. 





Risk Values: 

Kanwal 5 year model risk: 0.9%.

NCI Lifetime model risk: 7.3%.

Technique: 

Method: Whole Breast Handheld.  





Prior Study Comparison: 

8/31/2018  Screening Mammogram, Unknown. 9/27/2018 Right Diagnostic Mammogram, [facility]. 1/20/2022

Bilateral Screening Mammogram, [facility]. 





Findings: 

The whole breast of both breasts, the axilla of both breasts and the retroareolar of both breasts

were scanned.

A complete US of all four quadrants of the bilateral breasts along with retro-areolar region were

reviewed.



Right: No solid or cystic masses are identified.



Left:

* 1:00 5 cm from nipple is an anechoic probable cyst measuring up to 4 mm with suggestion of

posterior acoustic enhancement.

* 1:00 7 cm from nipple is an anechoic probable cyst measuring up to 5 mm on radial imaging and with

a bilobed appearance on antiradial imaging measuring up to 7 mm with suggestion of posterior

acoustic enhancement. 





Overall Assessment: Probably benign, BI-RAD 3





Management: 

Diagnostic Breast Ultrasound of the left breast in 6 months.

A clinical breast exam by your physician is recommended on an annual basis and results should be

correlated with mammographic findings.  This exam should not preclude additional follow-up of

suspicious palpable abnormalities.  Results were given to the patient verbally at the time of exam.



Electronically signed and approved by: Carson Mota DO

## 2025-03-06 ENCOUNTER — HOSPITAL ENCOUNTER (OUTPATIENT)
Dept: HOSPITAL 47 - RADMAMWWP | Age: 55
Discharge: HOME | End: 2025-03-06
Attending: INTERNAL MEDICINE
Payer: MEDICARE

## 2025-03-06 DIAGNOSIS — R92.333: ICD-10-CM

## 2025-03-06 DIAGNOSIS — R92.8: Primary | ICD-10-CM

## 2025-03-06 DIAGNOSIS — Z78.0: ICD-10-CM

## 2025-03-06 PROCEDURE — 77066 DX MAMMO INCL CAD BI: CPT

## 2025-03-06 PROCEDURE — 77062 BREAST TOMOSYNTHESIS BI: CPT

## 2025-03-06 NOTE — MM
Reason for Exam: Additional evaluation requested from prior study. 

Last mammogram was performed 2 year(s) and 1 month(s) ago. 





Patient History: 

Menarche at age 12. First Full-Term Pregnancy at age 18. Left ovary removed at age 37. Right ovary

removed at age 37. Hysterectomy at age 37. Postmenopausal. Other cancer. 2007, Bilateral Reduction.

Benign Excisional Biopsy on the right side. 





Risk Values: 

Kanwal 5 year model risk: 1.0%.

NCI Lifetime model risk: 7.0%.





Tissue Density: 

The breasts are heterogeneously dense, which may obscure small masses.





Findings: 

Analyzed By CAD. 

Bilateral areas of asymmetric density remain unchanged from 2023. No significant change is

identified. 





Overall Assessment: Benign, BI-RAD 2





Management: 

Screening Mammogram of both breasts in 1 year.

Results were given to the patient verbally at the time of exam.



Patient should continue monthly self-breast exams.  A clinical breast exam by your physician is

recommended on an annual basis.

This exam should not preclude additional follow-up of suspicious palpable abnormalities.





Note on Kanwal scores and lifetime risk:

1. A Kanwal score greater than 3% is considered moderate risk. If this is the case, consider

specialist referral to assess eligibility for a risk reducing agent.

2. If overall lifetime risk for the development of breast cancer is 20% or higher, the patient may

qualify for future screening with alternating mammogram and breast MRI.







[facility], Workstation: [id number], 3/6/2025 11:57 AM.



Electronically signed and approved by: Jairon Madera M.D. Radiologist